# Patient Record
Sex: FEMALE | Race: WHITE | NOT HISPANIC OR LATINO | Employment: FULL TIME | URBAN - METROPOLITAN AREA
[De-identification: names, ages, dates, MRNs, and addresses within clinical notes are randomized per-mention and may not be internally consistent; named-entity substitution may affect disease eponyms.]

---

## 2018-11-27 ENCOUNTER — HOSPITAL ENCOUNTER (EMERGENCY)
Facility: MEDICAL CENTER | Age: 23
End: 2018-11-27
Attending: EMERGENCY MEDICINE
Payer: COMMERCIAL

## 2018-11-27 VITALS
HEIGHT: 64 IN | BODY MASS INDEX: 22.06 KG/M2 | TEMPERATURE: 97.2 F | DIASTOLIC BLOOD PRESSURE: 91 MMHG | OXYGEN SATURATION: 96 % | SYSTOLIC BLOOD PRESSURE: 143 MMHG | HEART RATE: 119 BPM | WEIGHT: 129.19 LBS | RESPIRATION RATE: 18 BRPM

## 2018-11-27 DIAGNOSIS — J21.9 ACUTE BRONCHIOLITIS DUE TO UNSPECIFIED ORGANISM: ICD-10-CM

## 2018-11-27 DIAGNOSIS — J06.9 UPPER RESPIRATORY TRACT INFECTION, UNSPECIFIED TYPE: ICD-10-CM

## 2018-11-27 PROCEDURE — 99284 EMERGENCY DEPT VISIT MOD MDM: CPT

## 2018-11-27 RX ORDER — BENZONATATE 100 MG/1
100 CAPSULE ORAL 3 TIMES DAILY PRN
Qty: 20 CAP | Refills: 0 | Status: SHIPPED | OUTPATIENT
Start: 2018-11-27 | End: 2021-03-15

## 2018-11-27 RX ORDER — PREDNISONE 20 MG/1
40 TABLET ORAL DAILY
Qty: 10 TAB | Refills: 0 | Status: SHIPPED | OUTPATIENT
Start: 2018-11-27 | End: 2018-12-02

## 2018-11-27 RX ORDER — ALBUTEROL SULFATE 90 UG/1
2 AEROSOL, METERED RESPIRATORY (INHALATION) EVERY 4 HOURS PRN
Qty: 8.5 G | Refills: 0 | Status: SHIPPED | OUTPATIENT
Start: 2018-11-27 | End: 2021-03-15

## 2018-11-27 ASSESSMENT — PAIN SCALES - GENERAL: PAINLEVEL_OUTOF10: 7

## 2018-11-27 NOTE — ED PROVIDER NOTES
"ED Provider Note    CHIEF COMPLAINT  Chief Complaint   Patient presents with   • Cough       HPI  Lela Quezada is a 23 y.o. female who ambulates to the emergency department through triage for cough.  Patient describes cough for 5 days, nonproductive.  No wheezing.  Patient starts coughing attacks, difficulty to catch her breath.  Minimal nasal congestion.  No ear pain.  Scratchy throat without difficulty swallowing or breathing.  No fever or chills.  No syncope, palpitations, chest pain.    No history for asthma or reactive airway disease.  No tobacco use.  Patient does smoke marijuana sometimes.    Similar sick contacts at work.    REVIEW OF SYSTEMS  See HPI for further details.     PAST MEDICAL HISTORY   Denies    SOCIAL HISTORY  Social History     Social History Main Topics   • Smoking status: Never Smoker   • Smokeless tobacco: Not on file   • Alcohol use No   • Drug use: Yes     Types: Inhaled      Comment: occ THC   • Sexual activity: Not on file       SURGICAL HISTORY   has a past surgical history that includes cholecystectomy (14 yo).    CURRENT MEDICATIONS  Home Medications    **Home medications have not yet been reviewed for this encounter**         ALLERGIES  No Known Allergies    PHYSICAL EXAM  VITAL SIGNS: /91   Pulse (!) 119   Temp 36.2 °C (97.2 °F) (Temporal)   Resp 18   Ht 1.626 m (5' 4\")   Wt 58.6 kg (129 lb 3 oz)   SpO2 96%   BMI 22.18 kg/m²   Pulse ox interpretation: I interpret this pulse ox as normal.  Constitutional: Alert in no apparent distress.  HENT: Normocephalic, atraumatic. Bilateral external ears normal, Nose normal. Moist mucous membranes.  Oropharynx within normal limits, symmetrically enlarged tonsils, otherwise without erythema, edema, exudate.  Uvula midline.  Tolerating secretions.  No stridor or dysphonia.  Eyes: Pupils are equal and reactive, Conjunctiva normal.   Neck: Normal range of motion, Supple.  No meningeal irritation.  Lymphatic: No lymphadenopathy " noted.  No cervical or submandibular lymphadenopathy.  Cardiovascular: Mild tachycardia otherwise regular rate and rhythm, no murmurs. Distal pulses intact.  Thorax & Lungs: Normal breath sounds.  No wheezing/rales/ronchi. No increased work of breathing, clipped speech or retractions.  Dry hacking cough on exam.  Skin: Warm, Dry  Musculoskeletal: Good range of motion in all major joints.   Neurologic: Alert and oriented x4.  Endplates independently.  Psychiatric: Affect normal, Judgment normal, Mood normal.       COURSE & MEDICAL DECISION MAKING  Evaluation was consistent with acute bronchitis, given subtle other URI symptoms, suspect viral etiology.  No clinical evidence for sinusitis, pharyngitis, meningitis.  Lung sounds are unremarkable.  No clinical evidence for pneumonia.  Vital signs are stable mild tachycardia is appropriate in this setting, patient with near intractable cough during my exam.  No fever or hypotension.  Never hypoxic.  Otherwise well-appearing and nontoxic.    Patient is stable for discharge at this time, anticipatory guidance provided, albuterol as needed, Tessalon as needed, prednisone for 5 days, close follow-up is encouraged, and strict ED return instructions have been detailed. Patient is agreeable to the disposition and plan.    Patient's blood pressure was elevated in the emergency department, and has been referred to primary care for close monitoring.      FINAL IMPRESSION  (J21.9) Acute bronchiolitis due to unspecified organism  (J06.9) Upper respiratory tract infection, unspecified type      Electronically signed by: Kayce Hastings, 11/27/2018 9:42 AM      This dictation was created using voice recognition software. The accuracy of the dictation is limited to the abilities of the software. I expect there may be some errors of grammar and possibly content. The nursing notes were reviewed and certain aspects of this information were incorporated into this note.

## 2018-11-27 NOTE — ED TRIAGE NOTES
"Pt c/o cough x4 days. Pt coughing in triage with non productive cough. States \"my lungs burn after coughing\".   "

## 2018-11-27 NOTE — DISCHARGE INSTRUCTIONS
Follow-up with primary care this week for reevaluation, to establish care, for medication management close blood pressure monitoring.    Albuterol, 2 puffs inhaled, every 4-6 hours as needed for difficulty breathing, wheezing, cough.  Tessalon 3 times daily as needed for cough.  Prednisone daily for 5 days.    A cool mist humidifier may be beneficial for symptoms as well.    Return to the emergency department for difficulty breathing, wheezing, retractions, stridor, fever, syncope or other new concerns.

## 2021-01-26 ENCOUNTER — TELEPHONE (OUTPATIENT)
Dept: SCHEDULING | Facility: IMAGING CENTER | Age: 26
End: 2021-01-26

## 2021-03-15 ENCOUNTER — OFFICE VISIT (OUTPATIENT)
Dept: MEDICAL GROUP | Facility: LAB | Age: 26
End: 2021-03-15
Payer: COMMERCIAL

## 2021-03-15 ENCOUNTER — HOSPITAL ENCOUNTER (OUTPATIENT)
Dept: LAB | Facility: MEDICAL CENTER | Age: 26
End: 2021-03-15
Attending: FAMILY MEDICINE
Payer: COMMERCIAL

## 2021-03-15 VITALS
HEIGHT: 64 IN | TEMPERATURE: 97.4 F | HEART RATE: 96 BPM | SYSTOLIC BLOOD PRESSURE: 110 MMHG | WEIGHT: 128 LBS | BODY MASS INDEX: 21.85 KG/M2 | DIASTOLIC BLOOD PRESSURE: 72 MMHG | OXYGEN SATURATION: 97 %

## 2021-03-15 DIAGNOSIS — Z23 NEED FOR VACCINATION: ICD-10-CM

## 2021-03-15 DIAGNOSIS — K29.50 OTHER CHRONIC GASTRITIS WITHOUT HEMORRHAGE: ICD-10-CM

## 2021-03-15 DIAGNOSIS — Z13.6 ENCOUNTER FOR SCREENING FOR CARDIOVASCULAR DISORDERS: ICD-10-CM

## 2021-03-15 PROBLEM — R11.0 NAUSEA: Status: ACTIVE | Noted: 2021-03-15

## 2021-03-15 PROBLEM — K29.70 GASTRITIS: Status: ACTIVE | Noted: 2021-03-15

## 2021-03-15 LAB
25(OH)D3 SERPL-MCNC: 21 NG/ML (ref 30–100)
ALBUMIN SERPL BCP-MCNC: 4.5 G/DL (ref 3.2–4.9)
ALBUMIN/GLOB SERPL: 1.6 G/DL
ALP SERPL-CCNC: 54 U/L (ref 30–99)
ALT SERPL-CCNC: 7 U/L (ref 2–50)
ANION GAP SERPL CALC-SCNC: 11 MMOL/L (ref 7–16)
AST SERPL-CCNC: 16 U/L (ref 12–45)
BASOPHILS # BLD AUTO: 1 % (ref 0–1.8)
BASOPHILS # BLD: 0.05 K/UL (ref 0–0.12)
BILIRUB SERPL-MCNC: 0.5 MG/DL (ref 0.1–1.5)
BUN SERPL-MCNC: 12 MG/DL (ref 8–22)
CALCIUM SERPL-MCNC: 9.7 MG/DL (ref 8.5–10.5)
CHLORIDE SERPL-SCNC: 105 MMOL/L (ref 96–112)
CHOLEST SERPL-MCNC: 123 MG/DL (ref 100–199)
CO2 SERPL-SCNC: 22 MMOL/L (ref 20–33)
CREAT SERPL-MCNC: 0.75 MG/DL (ref 0.5–1.4)
CRP SERPL HS-MCNC: 0.14 MG/DL (ref 0–0.75)
EOSINOPHIL # BLD AUTO: 0.09 K/UL (ref 0–0.51)
EOSINOPHIL NFR BLD: 1.9 % (ref 0–6.9)
ERYTHROCYTE [DISTWIDTH] IN BLOOD BY AUTOMATED COUNT: 39.5 FL (ref 35.9–50)
ERYTHROCYTE [SEDIMENTATION RATE] IN BLOOD BY WESTERGREN METHOD: <1 MM/HOUR (ref 0–20)
FASTING STATUS PATIENT QL REPORTED: NORMAL
GLOBULIN SER CALC-MCNC: 2.9 G/DL (ref 1.9–3.5)
GLUCOSE SERPL-MCNC: 88 MG/DL (ref 65–99)
HCT VFR BLD AUTO: 37.8 % (ref 37–47)
HDLC SERPL-MCNC: 67 MG/DL
HGB BLD-MCNC: 12.6 G/DL (ref 12–16)
IMM GRANULOCYTES # BLD AUTO: 0 K/UL (ref 0–0.11)
IMM GRANULOCYTES NFR BLD AUTO: 0 % (ref 0–0.9)
IRON SATN MFR SERPL: 7 % (ref 15–55)
IRON SERPL-MCNC: 28 UG/DL (ref 40–170)
LDLC SERPL CALC-MCNC: 47 MG/DL
LYMPHOCYTES # BLD AUTO: 1.5 K/UL (ref 1–4.8)
LYMPHOCYTES NFR BLD: 31.1 % (ref 22–41)
MCH RBC QN AUTO: 27.7 PG (ref 27–33)
MCHC RBC AUTO-ENTMCNC: 33.3 G/DL (ref 33.6–35)
MCV RBC AUTO: 83.1 FL (ref 81.4–97.8)
MONOCYTES # BLD AUTO: 0.4 K/UL (ref 0–0.85)
MONOCYTES NFR BLD AUTO: 8.3 % (ref 0–13.4)
NEUTROPHILS # BLD AUTO: 2.79 K/UL (ref 2–7.15)
NEUTROPHILS NFR BLD: 57.7 % (ref 44–72)
NRBC # BLD AUTO: 0 K/UL
NRBC BLD-RTO: 0 /100 WBC
PLATELET # BLD AUTO: 194 K/UL (ref 164–446)
PMV BLD AUTO: 9.2 FL (ref 9–12.9)
POTASSIUM SERPL-SCNC: 4 MMOL/L (ref 3.6–5.5)
PROT SERPL-MCNC: 7.4 G/DL (ref 6–8.2)
RBC # BLD AUTO: 4.55 M/UL (ref 4.2–5.4)
SODIUM SERPL-SCNC: 138 MMOL/L (ref 135–145)
TIBC SERPL-MCNC: 375 UG/DL (ref 250–450)
TRIGL SERPL-MCNC: 45 MG/DL (ref 0–149)
UIBC SERPL-MCNC: 347 UG/DL (ref 110–370)
WBC # BLD AUTO: 4.8 K/UL (ref 4.8–10.8)

## 2021-03-15 PROCEDURE — 85652 RBC SED RATE AUTOMATED: CPT

## 2021-03-15 PROCEDURE — 83550 IRON BINDING TEST: CPT

## 2021-03-15 PROCEDURE — 82306 VITAMIN D 25 HYDROXY: CPT

## 2021-03-15 PROCEDURE — 90715 TDAP VACCINE 7 YRS/> IM: CPT | Performed by: FAMILY MEDICINE

## 2021-03-15 PROCEDURE — 36415 COLL VENOUS BLD VENIPUNCTURE: CPT

## 2021-03-15 PROCEDURE — 99203 OFFICE O/P NEW LOW 30 MIN: CPT | Mod: 25 | Performed by: FAMILY MEDICINE

## 2021-03-15 PROCEDURE — 90472 IMMUNIZATION ADMIN EACH ADD: CPT | Performed by: FAMILY MEDICINE

## 2021-03-15 PROCEDURE — 85025 COMPLETE CBC W/AUTO DIFF WBC: CPT

## 2021-03-15 PROCEDURE — 80053 COMPREHEN METABOLIC PANEL: CPT

## 2021-03-15 PROCEDURE — 90686 IIV4 VACC NO PRSV 0.5 ML IM: CPT | Performed by: FAMILY MEDICINE

## 2021-03-15 PROCEDURE — 90471 IMMUNIZATION ADMIN: CPT | Performed by: FAMILY MEDICINE

## 2021-03-15 PROCEDURE — 86140 C-REACTIVE PROTEIN: CPT

## 2021-03-15 PROCEDURE — 83540 ASSAY OF IRON: CPT

## 2021-03-15 PROCEDURE — 80061 LIPID PANEL: CPT

## 2021-03-15 ASSESSMENT — PATIENT HEALTH QUESTIONNAIRE - PHQ9: CLINICAL INTERPRETATION OF PHQ2 SCORE: 0

## 2021-03-15 NOTE — LETTER
[facility]  [id number]     March 15, 2021    Patient: Lela Quezada   YOB: 1995   Date of Visit: 3/15/2021       To Whom It May Concern:    Lela Quezada was seen and treated in our department on 3/15/2021. She is actively undergoing workup for chronic GI illness. Please allow use of bathroom as needed, for as long as needed while we work this up.     Sincerely,     Gricelda Dobson M.D.

## 2021-03-15 NOTE — PROGRESS NOTES
Chief Complaint   Patient presents with   • GI Problem         Lela Quezada is a 25 y.o. female here to establish care and to discuss GI problem.         HPI:   Gastritis  Persistent, constant. started summer before High School. Did have cholecystectomy but this did not help. Cannot recall any other insighting event.   Does have pain as well in the upper abdomen.    Present for years on and off.   Does have soft stools pretty much daily. Has to push a lot, but then its soft.   No blood in the stool. No weight loss.   Will get to a point of dry heaving, then sometimes vomiting bile.   Was on very many anti nausea meds in the past.   Cant find anything that makes things better.   Has had EGD in the past.   No conclusions from workup. Has been on PPIs without benefit.   Nausea first thing in the Am, sometimes late at night.   Needs to use the bathroom an hour or two right after she eats.   Red meats make it worse.   Other foods are on and off.   Marijuana doesn't help. Sleep seems to help and that's about it.   Tried different diets in the past, vegan, gluten free, etc.   Would like a note for work stating she needs longer bathroom breaks.   Reports records have all been with [facility]. May need paper chart request.       No Known Allergies    Current medicines (including changes today)  No current outpatient medications on file.     No current facility-administered medications for this visit.     She  has a past medical history of Chronic nausea and Irritable bowel syndrome (IBS) (age 15 started).  She  has a past surgical history that includes cholecystectomy (2010).  Social History     Tobacco Use   • Smoking status: Never Smoker   • Smokeless tobacco: Never Used   Substance Use Topics   • Alcohol use: Yes     Comment: very seldom, yearly maybe   • Drug use: Yes     Types: Inhaled, Marijuana     Comment: occ THC     Social History     Social History Narrative   • Not on file     Family History   Problem Relation  "Age of Onset   • Heart Disease Mother    • Diabetes Father    • Lung Disease Father      No family status information on file.         ROS  No fever or chills.  No nausea or vomiting.  No chest pain or palpitations.  No cough or SOB.  No pain with urination or hematuria.  No black or bloody stools.  All other systems reviewed and are negative     Objective:     /72 (BP Location: Left arm, Patient Position: Sitting, BP Cuff Size: Adult)   Pulse 96   Temp 36.3 °C (97.4 °F)   Ht 1.626 m (5' 4\")   Wt 58.1 kg (128 lb)   SpO2 97%  Body mass index is 21.97 kg/m².  Physical Exam:      Well developed, well nourished.  Alert, oriented in no acute distress.  Psych: Eye contact is good, speech goal directed, affect calm  Eyes: conjunctiva non-injected, sclera non-icteric.  Ears: Pinna normal. TM pearly gray.   Nose: Nares are patent.  Normal mucosa  Mouth: Oral mucous membranes pink and moist with no lesions.  Neck Supple.  No adenopathy or masses in the neck or supraclavicular regions. No thyromegaly  Lungs: clear to auscultation bilaterally with good excursion. No wheezes or rhonchi  CV: regular rate and rhythm. No murmur  Abdomen: soft, mild tenderness at right epigastrum, no masses or organomegaly.  No rebound or gaurding  Ext: no edema, color normal, vascularity normal, temperature normal      Assessment and Plan:     1. Other chronic gastritis without hemorrhage  Discussed need for obtaining records from previous procedures and work-up.  Will restart some blood work for work-up today as well.  Would like to wait on any medications at this time until we have a better understanding of what is going on.  Also meds in the past it seemed to not help very much.  Likely GI referral in the future, pending labs, and pending any imaging needed.  - CBC WITH DIFFERENTIAL; Future  - Lipid Profile; Future  - Comp Metabolic Panel; Future  - Sed Rate; Future  - IRON/TOTAL IRON BIND; Future  - VITAMIN D,25 HYDROXY; Future  - " CRP QUANTITIVE (NON-CARDIAC); Future    She will come back in the next 6 months or so for regular physical/well woman with Pap and STD testing.    2. Need for vaccination  We will update flu vaccine and tetanus booster today.  - Influenza Vaccine Quad Injection (PF)  - Tdap Vaccine =>8YO IM    3. Encounter for screening for cardiovascular disorders  Screening labs as below.  - Lipid Profile; Future  - Comp Metabolic Panel; Future\  Any change or worsening of signs or symptoms, patient encouraged to follow-up or report to the emergency room for further evaluation. Patient understands and agrees.    Followup: Return in about 6 months (around 9/15/2021) for f/u for AWV, pap, STD screening.

## 2021-03-15 NOTE — ASSESSMENT & PLAN NOTE
Persistent, constant. started summer before High School. Did have cholecystectomy but this did not help. Cannot recall any other insighting event.   Does have pain as well in the upper abdomen.    Present for years on and off.   Does have soft stools pretty much daily. Has to push a lot, but then its soft.   No blood in the stool. No weight loss.   Will get to a point of dry heaving, then sometimes vomiting bile.   Was on very many anti nausea meds in the past.   Cant find anything that makes things better.   Has had EGD in the past.   No conclusions from workup. Has been on PPIs without benefit.   Nausea first thing in the Am, sometimes late at night.   Needs to use the bathroom an hour or two right after she eats.   Red meats make it worse.   Other foods are on and off.   Marijuana doesn't help. Sleep seems to help and that's about it.   Tried different diets in the past, vegan, gluten free, etc.   Would like a note for work stating she needs longer bathroom breaks.

## 2021-11-12 ENCOUNTER — HOSPITAL ENCOUNTER (OUTPATIENT)
Facility: MEDICAL CENTER | Age: 26
End: 2021-11-12
Attending: FAMILY MEDICINE
Payer: COMMERCIAL

## 2021-11-12 ENCOUNTER — OFFICE VISIT (OUTPATIENT)
Dept: MEDICAL GROUP | Facility: LAB | Age: 26
End: 2021-11-12
Payer: COMMERCIAL

## 2021-11-12 VITALS
TEMPERATURE: 98.3 F | WEIGHT: 125 LBS | RESPIRATION RATE: 12 BRPM | HEART RATE: 83 BPM | HEIGHT: 64 IN | SYSTOLIC BLOOD PRESSURE: 106 MMHG | BODY MASS INDEX: 21.34 KG/M2 | DIASTOLIC BLOOD PRESSURE: 56 MMHG | OXYGEN SATURATION: 95 %

## 2021-11-12 DIAGNOSIS — Z11.3 ROUTINE SCREENING FOR STI (SEXUALLY TRANSMITTED INFECTION): ICD-10-CM

## 2021-11-12 DIAGNOSIS — Z12.4 SCREENING FOR CERVICAL CANCER: ICD-10-CM

## 2021-11-12 DIAGNOSIS — Z23 NEED FOR VACCINATION: ICD-10-CM

## 2021-11-12 PROCEDURE — 87491 CHLMYD TRACH DNA AMP PROBE: CPT

## 2021-11-12 PROCEDURE — 90686 IIV4 VACC NO PRSV 0.5 ML IM: CPT | Performed by: FAMILY MEDICINE

## 2021-11-12 PROCEDURE — 90471 IMMUNIZATION ADMIN: CPT | Performed by: FAMILY MEDICINE

## 2021-11-12 PROCEDURE — 99000 SPECIMEN HANDLING OFFICE-LAB: CPT | Performed by: FAMILY MEDICINE

## 2021-11-12 PROCEDURE — 87591 N.GONORRHOEAE DNA AMP PROB: CPT

## 2021-11-12 PROCEDURE — 88175 CYTOPATH C/V AUTO FLUID REDO: CPT

## 2021-11-12 PROCEDURE — 99395 PREV VISIT EST AGE 18-39: CPT | Mod: 25 | Performed by: FAMILY MEDICINE

## 2021-11-12 ASSESSMENT — FIBROSIS 4 INDEX: FIB4 SCORE: 0.78

## 2021-11-12 NOTE — PROGRESS NOTES
"Subjective:     Chief Complaint   Patient presents with   • Follow-Up         HPI:   Lela presents today to follow up upset stomach and also needs a pap. Doing well in general.    Last Pap:  Never had.   LMP: 10/22/21 Pretty regular, Heavy, cramping, painful  Contraception: withdrawal method. Not using condoms. Not really trying.  Has used birth control in the past when she was a teenager but got very nauseated and with headaches and so stopped.  She declines any further discussion about birth control at this time she states has been 8 years and they have not gotten pregnant yet she feels comfortable.  Did advise condoms still.  STI history: None, will do screening today. No new partners. 8 years with one partner, only partner.           Current Outpatient Medications Ordered in Epic   Medication Sig Dispense Refill   • Multiple Vitamins-Minerals (ONE-A-DAY WOMENS PO) Take 1 tablet by mouth every day.       No current [facility]-ordered facility-administered medications on file.         ROS:  Gen: no fevers/chills, no changes in weight  Eyes: no changes in vision  ENT: no sore throat, no hearing loss, no bloody nose  Pulm: no sob, no cough  CV: no chest pain, no palpitations  GI: no nausea/vomiting, no diarrhea  : no dysuria  MSk: no myalgias  Skin: no rash  Neuro: no headaches, no numbness/tingling  Heme/Lymph: no easy bruising      Objective:     Exam:  /56 (BP Location: Right arm, Patient Position: Sitting, BP Cuff Size: Small adult)   Pulse 83   Temp 36.8 °C (98.3 °F)   Resp 12   Ht 1.626 m (5' 4\")   Wt 56.7 kg (125 lb)   SpO2 95%   BMI 21.46 kg/m²  Body mass index is 21.46 kg/m².    Gen: Alert and oriented, No apparent distress.  Neck: Neck is supple without lymphadenopathy.  Lungs: Normal effort, CTA bilaterally, no wheezes, rhonchi, or rales  CV: Regular rate and rhythm. No murmurs, rubs, or gallops.  Ext: No clubbing, cyanosis, edema.  : Cervix appears pink and healthy.  Vaginal vault is normal.  " Scant physiologic discharge noted, no CMT, bimanual exam is normal without any appreciated masses.      Assessment & Plan:     26 y.o. female with the following -     1. Screening for cervical cancer  Discussed Pap schedule.  We will get this done today.  Again advised at least condoms for birth control if she is not ready to have a child at this time.  - THINPREP PAP, REFLEX HPV ON ASC-US AND ABOVE; Future    2. Need for vaccination  Flu shot today.  - INFLUENZA VACCINE QUAD INJ (PF)    3. Routine screening for STI (sexually transmitted infection)  We will do routine STD screening as well due to her age group.  - CHLAMYDIA/GC PCR URINE OR SWAB; Future          No follow-ups on file.    Please note that this dictation was created using voice recognition software. I have made every reasonable attempt to correct obvious errors, but I expect that there are errors of grammar and possibly content that I did not discover before finalizing the note.

## 2021-11-14 LAB
C TRACH DNA SPEC QL NAA+PROBE: NEGATIVE
N GONORRHOEA DNA SPEC QL NAA+PROBE: NEGATIVE
SPECIMEN SOURCE: NORMAL

## 2021-11-15 LAB — CYTOLOGY REG CYTOL: NORMAL
